# Patient Record
Sex: MALE | Race: WHITE | NOT HISPANIC OR LATINO | URBAN - METROPOLITAN AREA
[De-identification: names, ages, dates, MRNs, and addresses within clinical notes are randomized per-mention and may not be internally consistent; named-entity substitution may affect disease eponyms.]

---

## 2024-05-04 ENCOUNTER — INPATIENT (INPATIENT)
Facility: HOSPITAL | Age: 84
LOS: 1 days | Discharge: HOME | DRG: 390 | End: 2024-05-06
Attending: SURGERY | Admitting: SURGERY
Payer: MEDICARE

## 2024-05-04 VITALS
OXYGEN SATURATION: 96 % | HEART RATE: 66 BPM | WEIGHT: 164.91 LBS | SYSTOLIC BLOOD PRESSURE: 146 MMHG | RESPIRATION RATE: 20 BRPM | DIASTOLIC BLOOD PRESSURE: 67 MMHG | TEMPERATURE: 98 F

## 2024-05-04 LAB
ALBUMIN SERPL ELPH-MCNC: 3.1 G/DL — LOW (ref 3.5–5.2)
ALP SERPL-CCNC: 97 U/L — SIGNIFICANT CHANGE UP (ref 30–115)
ALT FLD-CCNC: 7 U/L — SIGNIFICANT CHANGE UP (ref 0–41)
ANION GAP SERPL CALC-SCNC: 6 MMOL/L — LOW (ref 7–14)
APPEARANCE UR: CLEAR — SIGNIFICANT CHANGE UP
APTT BLD: 32.2 SEC — SIGNIFICANT CHANGE UP (ref 27–39.2)
AST SERPL-CCNC: 8 U/L — SIGNIFICANT CHANGE UP (ref 0–41)
BASOPHILS # BLD AUTO: 0.02 K/UL — SIGNIFICANT CHANGE UP (ref 0–0.2)
BASOPHILS NFR BLD AUTO: 0.3 % — SIGNIFICANT CHANGE UP (ref 0–1)
BILIRUB SERPL-MCNC: 0.3 MG/DL — SIGNIFICANT CHANGE UP (ref 0.2–1.2)
BILIRUB UR-MCNC: NEGATIVE — SIGNIFICANT CHANGE UP
BUN SERPL-MCNC: 11 MG/DL — SIGNIFICANT CHANGE UP (ref 10–20)
CALCIUM SERPL-MCNC: 8.3 MG/DL — LOW (ref 8.4–10.4)
CHLORIDE SERPL-SCNC: 101 MMOL/L — SIGNIFICANT CHANGE UP (ref 98–110)
CO2 SERPL-SCNC: 32 MMOL/L — SIGNIFICANT CHANGE UP (ref 17–32)
COLOR SPEC: YELLOW — SIGNIFICANT CHANGE UP
CREAT SERPL-MCNC: 0.7 MG/DL — SIGNIFICANT CHANGE UP (ref 0.7–1.5)
DIFF PNL FLD: NEGATIVE — SIGNIFICANT CHANGE UP
EGFR: 91 ML/MIN/1.73M2 — SIGNIFICANT CHANGE UP
EOSINOPHIL # BLD AUTO: 0.05 K/UL — SIGNIFICANT CHANGE UP (ref 0–0.7)
EOSINOPHIL NFR BLD AUTO: 0.7 % — SIGNIFICANT CHANGE UP (ref 0–8)
GAS PNL BLDV: SIGNIFICANT CHANGE UP
GLUCOSE SERPL-MCNC: 113 MG/DL — HIGH (ref 70–99)
GLUCOSE UR QL: NEGATIVE MG/DL — SIGNIFICANT CHANGE UP
HCT VFR BLD CALC: 35.7 % — LOW (ref 42–52)
HGB BLD-MCNC: 11 G/DL — LOW (ref 14–18)
IMM GRANULOCYTES NFR BLD AUTO: 0.6 % — HIGH (ref 0.1–0.3)
INR BLD: 0.99 RATIO — SIGNIFICANT CHANGE UP (ref 0.65–1.3)
KETONES UR-MCNC: NEGATIVE MG/DL — SIGNIFICANT CHANGE UP
LEUKOCYTE ESTERASE UR-ACNC: NEGATIVE — SIGNIFICANT CHANGE UP
LIDOCAIN IGE QN: 25 U/L — SIGNIFICANT CHANGE UP (ref 7–60)
LYMPHOCYTES # BLD AUTO: 1.25 K/UL — SIGNIFICANT CHANGE UP (ref 1.2–3.4)
LYMPHOCYTES # BLD AUTO: 17.8 % — LOW (ref 20.5–51.1)
MAGNESIUM SERPL-MCNC: 1.7 MG/DL — LOW (ref 1.8–2.4)
MCHC RBC-ENTMCNC: 25.2 PG — LOW (ref 27–31)
MCHC RBC-ENTMCNC: 30.8 G/DL — LOW (ref 32–37)
MCV RBC AUTO: 81.7 FL — SIGNIFICANT CHANGE UP (ref 80–94)
MONOCYTES # BLD AUTO: 0.34 K/UL — SIGNIFICANT CHANGE UP (ref 0.1–0.6)
MONOCYTES NFR BLD AUTO: 4.8 % — SIGNIFICANT CHANGE UP (ref 1.7–9.3)
NEUTROPHILS # BLD AUTO: 5.32 K/UL — SIGNIFICANT CHANGE UP (ref 1.4–6.5)
NEUTROPHILS NFR BLD AUTO: 75.8 % — HIGH (ref 42.2–75.2)
NITRITE UR-MCNC: NEGATIVE — SIGNIFICANT CHANGE UP
NRBC # BLD: 0 /100 WBCS — SIGNIFICANT CHANGE UP (ref 0–0)
PH UR: 7.5 — SIGNIFICANT CHANGE UP (ref 5–8)
PLATELET # BLD AUTO: 249 K/UL — SIGNIFICANT CHANGE UP (ref 130–400)
PMV BLD: 11.1 FL — HIGH (ref 7.4–10.4)
POTASSIUM SERPL-MCNC: 4.2 MMOL/L — SIGNIFICANT CHANGE UP (ref 3.5–5)
POTASSIUM SERPL-SCNC: 4.2 MMOL/L — SIGNIFICANT CHANGE UP (ref 3.5–5)
PROT SERPL-MCNC: 5.5 G/DL — LOW (ref 6–8)
PROT UR-MCNC: NEGATIVE MG/DL — SIGNIFICANT CHANGE UP
PROTHROM AB SERPL-ACNC: 11.3 SEC — SIGNIFICANT CHANGE UP (ref 9.95–12.87)
RBC # BLD: 4.37 M/UL — LOW (ref 4.7–6.1)
RBC # FLD: 14.9 % — HIGH (ref 11.5–14.5)
SODIUM SERPL-SCNC: 139 MMOL/L — SIGNIFICANT CHANGE UP (ref 135–146)
SP GR SPEC: 1.01 — SIGNIFICANT CHANGE UP (ref 1–1.03)
TROPONIN T, HIGH SENSITIVITY RESULT: 14 NG/L — SIGNIFICANT CHANGE UP (ref 6–21)
UROBILINOGEN FLD QL: 0.2 MG/DL — SIGNIFICANT CHANGE UP (ref 0.2–1)
WBC # BLD: 7.02 K/UL — SIGNIFICANT CHANGE UP (ref 4.8–10.8)
WBC # FLD AUTO: 7.02 K/UL — SIGNIFICANT CHANGE UP (ref 4.8–10.8)

## 2024-05-04 PROCEDURE — 74174 CTA ABD&PLVS W/CONTRAST: CPT | Mod: 26,MC

## 2024-05-04 PROCEDURE — 71275 CT ANGIOGRAPHY CHEST: CPT | Mod: 26,MC

## 2024-05-04 PROCEDURE — 71045 X-RAY EXAM CHEST 1 VIEW: CPT | Mod: 26

## 2024-05-04 PROCEDURE — 99285 EMERGENCY DEPT VISIT HI MDM: CPT

## 2024-05-04 PROCEDURE — 74176 CT ABD & PELVIS W/O CONTRAST: CPT | Mod: 26,59,MC

## 2024-05-04 RX ORDER — ONDANSETRON 8 MG/1
4 TABLET, FILM COATED ORAL ONCE
Refills: 0 | Status: COMPLETED | OUTPATIENT
Start: 2024-05-04 | End: 2024-05-04

## 2024-05-04 RX ORDER — MORPHINE SULFATE 50 MG/1
4 CAPSULE, EXTENDED RELEASE ORAL ONCE
Refills: 0 | Status: DISCONTINUED | OUTPATIENT
Start: 2024-05-04 | End: 2024-05-04

## 2024-05-04 RX ORDER — IOHEXOL 300 MG/ML
30 INJECTION, SOLUTION INTRAVENOUS ONCE
Refills: 0 | Status: COMPLETED | OUTPATIENT
Start: 2024-05-04 | End: 2024-05-04

## 2024-05-04 RX ORDER — MAGNESIUM SULFATE 500 MG/ML
2 VIAL (ML) INJECTION ONCE
Refills: 0 | Status: COMPLETED | OUTPATIENT
Start: 2024-05-04 | End: 2024-05-04

## 2024-05-04 RX ADMIN — ONDANSETRON 4 MILLIGRAM(S): 8 TABLET, FILM COATED ORAL at 17:59

## 2024-05-04 RX ADMIN — Medication 25 GRAM(S): at 19:32

## 2024-05-04 RX ADMIN — MORPHINE SULFATE 4 MILLIGRAM(S): 50 CAPSULE, EXTENDED RELEASE ORAL at 17:59

## 2024-05-04 RX ADMIN — IOHEXOL 30 MILLILITER(S): 300 INJECTION, SOLUTION INTRAVENOUS at 20:50

## 2024-05-04 RX ADMIN — MORPHINE SULFATE 4 MILLIGRAM(S): 50 CAPSULE, EXTENDED RELEASE ORAL at 22:24

## 2024-05-04 NOTE — ED ADULT TRIAGE NOTE - CHIEF COMPLAINT QUOTE
Patient BIBEMS for abdominal pain that started Thursday and constipation, Pt given 100mcg of fentanyl and 8mg of Zofran in the field by seble

## 2024-05-04 NOTE — ED PROVIDER NOTE - CLINICAL SUMMARY MEDICAL DECISION MAKING FREE TEXT BOX
Patient presents with abdominal pain and diarrhea.  Labs and imaging done.  Found to have an SBO.  Surgical team requesting a repeat CT scan after oral contrast which was done.  Repeat scan shows SBO.  Patient admitted to surgical team for further management.

## 2024-05-04 NOTE — ED PROVIDER NOTE - PROGRESS NOTE DETAILS
pt pending surg consult for possible sbo Received sign out from Dr. Perez pending ct and surgery evaluation. Patient presents with abdominal pain. CT shows ilius vs SBO, surgery consulted who is recommending rpt ct scan with oral contrast. Scan pending. Patient reassessed, more comfortable at this time after pain medications.

## 2024-05-04 NOTE — ED PROVIDER NOTE - PHYSICAL EXAMINATION
CONSTITUTIONAL: in no apparent distress.   ENT: Hearing is intact with good acuity to spoken voice.  Patient is speaking clearly, not muffled and airway is intact.   RESPIRATORY: No signs of respiratory distress. Lung sounds are clear in all lobes bilaterally without rales, rhonchi, or wheezes.  CARDIOVASCULAR: Regular rate and rhythm.   GI: tender to palpation in epigastric and periumbilical area. Abdomen is soft, and without distention. Bowel sounds are present and normoactive in all four quadrants. No masses are noted.   BACK: No evidence of trauma or deformity. No CVA tenderness bilaterally. Normal ROM.   NEURO: A & O x 3. Normal speech. No focal deficit.  PSYCHOLOGICAL: Appropriate mood and affect. Good judgement and insight.

## 2024-05-04 NOTE — ED PROVIDER NOTE - OBJECTIVE STATEMENT
83-year-old male with a past medical history of A-fib on Eliquis, BPH, hypertension, and hyperlipidemia who presents to the ED with abdominal pain.  Reports that symptoms started earlier today; pain is in the epigastric area, sharp, constant, and there is no alleviating or worsening factor.  Also endorses diarrhea intermittently for the past 2 days.  Also endorses nausea, but denies vomiting.  Denies fever, shortness of breath, chest pain, vomiting, hematuria, dysuria, and change with bowel movement.

## 2024-05-04 NOTE — ED PROVIDER NOTE - ATTENDING APP SHARED VISIT CONTRIBUTION OF CARE
83-year-old male with past medical history of A-fib on Eliquis, BPH, hypertension, hyperlipidemia, prostate CA, post appendectomy and hernia repair, brought in by ambulance for severe abdominal pain.  As per family patient has been having abdominal cramping and nausea with diarrhea for the last few days.  Patient saw his GI a few days ago and had labs done which were reassuring.  Patient continued to have symptoms, took Pepto this morning and started having more severe pain.  Patient denies any fever or chills.  Patient denies any chest pain or shortness of breath.  Patient does not have any urinary symptoms.  Patient was given fentanyl by [place] which abated the pain, but it has returned while he was in the ED.  Exam: nad, ncat, perrl, eomi, mmm, rrr, ctab, abd soft, tender in the right lower quadrant and epigastrium, mildly distended, aox3, impression: Patient with severe abdominal pain, will check labs, CT abdomen

## 2024-05-04 NOTE — ED PROVIDER NOTE - NS ED MD DISPO DIVISION
Patient is a 89y old  Male who presents with a chief complaint of FTT (10 Aug 2023 19:31)      INTERVAL HPI/OVERNIGHT EVENTS: stable, appetite improved, d/w dtr, agreed to discharge him to home with palliative care. no hospice. repeat ed U/C negative. no need IV abx.     Pain Location & Control: ok    MEDICATIONS  (STANDING):  aspirin enteric coated 81 milliGRAM(s) Oral daily  atorvastatin 80 milliGRAM(s) Oral at bedtime  chlorhexidine 2% Cloths 1 Application(s) Topical <User Schedule>  enoxaparin Injectable 30 milliGRAM(s) SubCutaneous every 24 hours  folic acid 1 milliGRAM(s) Oral daily  levETIRAcetam 500 milliGRAM(s) Oral two times a day  metoprolol tartrate 25 milliGRAM(s) Oral two times a day  mirtazapine 15 milliGRAM(s) Oral daily  pantoprazole    Tablet 40 milliGRAM(s) Oral before breakfast    MEDICATIONS  (PRN):      Allergies    No Known Allergies    Intolerances        REVIEW OF SYSTEMS:  CONSTITUTIONAL: No fever, weight loss, or fatigue  EYES: No eye pain, visual disturbances, or discharge  ENMT:  No difficulty hearing, tinnitus, vertigo; No sinus or throat pain  NECK: No pain or stiffness  BREASTS: No pain, masses, or nipple discharge  RESPIRATORY: No cough, wheezing, chills or hemoptysis; No shortness of breath  CARDIOVASCULAR: No chest pain, palpitations, dizziness, or leg swelling  GASTROINTESTINAL: No abdominal or epigastric pain. No nausea, vomiting, or hematemesis; No diarrhea or constipation. No melena or hematochezia.  GENITOURINARY: No dysuria, frequency, hematuria, or incontinence  NEUROLOGICAL: No headaches, memory loss, loss of strength, numbness, or tremors  SKIN: No itching, burning, rashes, or lesions   LYMPH NODES: No enlarged glands  ENDOCRINE: No heat or cold intolerance; No hair loss; No polydipsia or polyuria  MUSCULOSKELETAL: No back pain  PSYCHIATRIC: No depression, anxiety, mood swings, or difficulty sleeping  HEME/LYMPH: No easy bruising, or bleeding gums  ALLERGY AND IMMUNOLOGIC: No hives or eczema    Vital Signs Last 24 Hrs  T(C): 36.4 (10 Aug 2023 11:10), Max: 36.6 (10 Aug 2023 04:42)  T(F): 97.6 (10 Aug 2023 11:10), Max: 97.9 (10 Aug 2023 04:42)  HR: 63 (10 Aug 2023 17:58) (53 - 74)  BP: 136/62 (10 Aug 2023 17:58) (96/60 - 136/62)  BP(mean): --  RR: 18 (10 Aug 2023 17:58) (16 - 18)  SpO2: 99% (10 Aug 2023 17:58) (94% - 99%)    Parameters below as of 10 Aug 2023 17:58  Patient On (Oxygen Delivery Method): room air        PHYSICAL EXAM:  GENERAL: NAD, well-groomed, well-developed  HEAD:  Atraumatic, Normocephalic  EYES: EOMI, PERRLA, conjunctiva and sclera clear  ENMT: No tonsillar erythema, exudates, or enlargement; Moist mucous membranes, Good dentition, No lesions  NECK: Supple, No JVD, Normal thyroid  NERVOUS SYSTEM:  Alert & Oriented X1, Good concentration; Motor Strength 5/5 B/L upper and lower extremities; DTRs 2+ intact and symmetric  CHEST/LUNG: Clear to auscultation bilaterally; No rales, rhonchi, wheezing, or rubs  HEART: Regular rate and rhythm; No murmurs, rubs, or gallops  ABDOMEN: Soft, Nontender, Nondistended; Bowel sounds present  EXTREMITIES:  2+ Peripheral Pulses, No clubbing or cyanosis  LYMPH: No lymphadenopathy noted  SKIN: No rashes or lesions      LABS:      Ca    9.6        09 Aug 2023 07:17        Urinalysis Basic - ( 09 Aug 2023 07:17 )    Color: x / Appearance: x / SG: x / pH: x  Gluc: 102 mg/dL / Ketone: x  / Bili: x / Urobili: x   Blood: x / Protein: x / Nitrite: x   Leuk Esterase: x / RBC: x / WBC x   Sq Epi: x / Non Sq Epi: x / Bacteria: x      CAPILLARY BLOOD GLUCOSE            Cultures  Culture Results:   10,000 - 49,000 CFU/mL Candida albicans "Susceptibilities not performed"  <10,000 CFU/ml Normal Urogenital edouard present (08-07-23 @ 21:46)      RADIOLOGY & ADDITIONAL TESTS:    Imaging Personally Reviewed:  [x ] YES  [ ] NO    Consultant(s) Notes Reviewed:  [x ] YES  [ ] NO    Care Discussed with Consultants/Other Providers [ x] YES  [ ] NO [facility]

## 2024-05-05 DIAGNOSIS — K56.609 UNSPECIFIED INTESTINAL OBSTRUCTION, UNSPECIFIED AS TO PARTIAL VERSUS COMPLETE OBSTRUCTION: ICD-10-CM

## 2024-05-05 DIAGNOSIS — Z90.49 ACQUIRED ABSENCE OF OTHER SPECIFIED PARTS OF DIGESTIVE TRACT: Chronic | ICD-10-CM

## 2024-05-05 DIAGNOSIS — Z98.890 OTHER SPECIFIED POSTPROCEDURAL STATES: Chronic | ICD-10-CM

## 2024-05-05 LAB
ANION GAP SERPL CALC-SCNC: 5 MMOL/L — LOW (ref 7–14)
ANION GAP SERPL CALC-SCNC: 9 MMOL/L — SIGNIFICANT CHANGE UP (ref 7–14)
BASOPHILS # BLD AUTO: 0.03 K/UL — SIGNIFICANT CHANGE UP (ref 0–0.2)
BASOPHILS NFR BLD AUTO: 0.4 % — SIGNIFICANT CHANGE UP (ref 0–1)
BUN SERPL-MCNC: 11 MG/DL — SIGNIFICANT CHANGE UP (ref 10–20)
BUN SERPL-MCNC: 12 MG/DL — SIGNIFICANT CHANGE UP (ref 10–20)
CALCIUM SERPL-MCNC: 7.8 MG/DL — LOW (ref 8.4–10.5)
CALCIUM SERPL-MCNC: 8.4 MG/DL — SIGNIFICANT CHANGE UP (ref 8.4–10.5)
CHLORIDE SERPL-SCNC: 104 MMOL/L — SIGNIFICANT CHANGE UP (ref 98–110)
CHLORIDE SERPL-SCNC: 105 MMOL/L — SIGNIFICANT CHANGE UP (ref 98–110)
CO2 SERPL-SCNC: 27 MMOL/L — SIGNIFICANT CHANGE UP (ref 17–32)
CO2 SERPL-SCNC: 28 MMOL/L — SIGNIFICANT CHANGE UP (ref 17–32)
CREAT SERPL-MCNC: 0.7 MG/DL — SIGNIFICANT CHANGE UP (ref 0.7–1.5)
CREAT SERPL-MCNC: 0.8 MG/DL — SIGNIFICANT CHANGE UP (ref 0.7–1.5)
EGFR: 87 ML/MIN/1.73M2 — SIGNIFICANT CHANGE UP
EGFR: 91 ML/MIN/1.73M2 — SIGNIFICANT CHANGE UP
EOSINOPHIL # BLD AUTO: 0.11 K/UL — SIGNIFICANT CHANGE UP (ref 0–0.7)
EOSINOPHIL NFR BLD AUTO: 1.5 % — SIGNIFICANT CHANGE UP (ref 0–8)
GLUCOSE SERPL-MCNC: 85 MG/DL — SIGNIFICANT CHANGE UP (ref 70–99)
GLUCOSE SERPL-MCNC: 94 MG/DL — SIGNIFICANT CHANGE UP (ref 70–99)
HCT VFR BLD CALC: 34.4 % — LOW (ref 42–52)
HCT VFR BLD CALC: 34.8 % — LOW (ref 42–52)
HGB BLD-MCNC: 10.3 G/DL — LOW (ref 14–18)
HGB BLD-MCNC: 10.5 G/DL — LOW (ref 14–18)
IMM GRANULOCYTES NFR BLD AUTO: 0.4 % — HIGH (ref 0.1–0.3)
LYMPHOCYTES # BLD AUTO: 1.89 K/UL — SIGNIFICANT CHANGE UP (ref 1.2–3.4)
LYMPHOCYTES # BLD AUTO: 26.1 % — SIGNIFICANT CHANGE UP (ref 20.5–51.1)
MAGNESIUM SERPL-MCNC: 1.9 MG/DL — SIGNIFICANT CHANGE UP (ref 1.8–2.4)
MAGNESIUM SERPL-MCNC: 2.1 MG/DL — SIGNIFICANT CHANGE UP (ref 1.8–2.4)
MCHC RBC-ENTMCNC: 24.8 PG — LOW (ref 27–31)
MCHC RBC-ENTMCNC: 25 PG — LOW (ref 27–31)
MCHC RBC-ENTMCNC: 29.9 G/DL — LOW (ref 32–37)
MCHC RBC-ENTMCNC: 30.2 G/DL — LOW (ref 32–37)
MCV RBC AUTO: 82.9 FL — SIGNIFICANT CHANGE UP (ref 80–94)
MCV RBC AUTO: 82.9 FL — SIGNIFICANT CHANGE UP (ref 80–94)
MONOCYTES # BLD AUTO: 0.53 K/UL — SIGNIFICANT CHANGE UP (ref 0.1–0.6)
MONOCYTES NFR BLD AUTO: 7.3 % — SIGNIFICANT CHANGE UP (ref 1.7–9.3)
NEUTROPHILS # BLD AUTO: 4.64 K/UL — SIGNIFICANT CHANGE UP (ref 1.4–6.5)
NEUTROPHILS NFR BLD AUTO: 64.3 % — SIGNIFICANT CHANGE UP (ref 42.2–75.2)
NRBC # BLD: 0 /100 WBCS — SIGNIFICANT CHANGE UP (ref 0–0)
NRBC # BLD: 0 /100 WBCS — SIGNIFICANT CHANGE UP (ref 0–0)
PHOSPHATE SERPL-MCNC: 3 MG/DL — SIGNIFICANT CHANGE UP (ref 2.1–4.9)
PHOSPHATE SERPL-MCNC: 3.3 MG/DL — SIGNIFICANT CHANGE UP (ref 2.1–4.9)
PLATELET # BLD AUTO: 211 K/UL — SIGNIFICANT CHANGE UP (ref 130–400)
PLATELET # BLD AUTO: 226 K/UL — SIGNIFICANT CHANGE UP (ref 130–400)
PMV BLD: 11 FL — HIGH (ref 7.4–10.4)
PMV BLD: 11.2 FL — HIGH (ref 7.4–10.4)
POTASSIUM SERPL-MCNC: 3.9 MMOL/L — SIGNIFICANT CHANGE UP (ref 3.5–5)
POTASSIUM SERPL-MCNC: 3.9 MMOL/L — SIGNIFICANT CHANGE UP (ref 3.5–5)
POTASSIUM SERPL-SCNC: 3.9 MMOL/L — SIGNIFICANT CHANGE UP (ref 3.5–5)
POTASSIUM SERPL-SCNC: 3.9 MMOL/L — SIGNIFICANT CHANGE UP (ref 3.5–5)
RBC # BLD: 4.15 M/UL — LOW (ref 4.7–6.1)
RBC # BLD: 4.2 M/UL — LOW (ref 4.7–6.1)
RBC # FLD: 14.9 % — HIGH (ref 11.5–14.5)
RBC # FLD: 15.1 % — HIGH (ref 11.5–14.5)
SODIUM SERPL-SCNC: 137 MMOL/L — SIGNIFICANT CHANGE UP (ref 135–146)
SODIUM SERPL-SCNC: 141 MMOL/L — SIGNIFICANT CHANGE UP (ref 135–146)
WBC # BLD: 6.79 K/UL — SIGNIFICANT CHANGE UP (ref 4.8–10.8)
WBC # BLD: 7.23 K/UL — SIGNIFICANT CHANGE UP (ref 4.8–10.8)
WBC # FLD AUTO: 6.79 K/UL — SIGNIFICANT CHANGE UP (ref 4.8–10.8)
WBC # FLD AUTO: 7.23 K/UL — SIGNIFICANT CHANGE UP (ref 4.8–10.8)

## 2024-05-05 PROCEDURE — 99233 SBSQ HOSP IP/OBS HIGH 50: CPT

## 2024-05-05 PROCEDURE — 85025 COMPLETE CBC W/AUTO DIFF WBC: CPT

## 2024-05-05 PROCEDURE — 74018 RADEX ABDOMEN 1 VIEW: CPT | Mod: 26

## 2024-05-05 PROCEDURE — 83735 ASSAY OF MAGNESIUM: CPT

## 2024-05-05 PROCEDURE — 85027 COMPLETE CBC AUTOMATED: CPT

## 2024-05-05 PROCEDURE — 36415 COLL VENOUS BLD VENIPUNCTURE: CPT

## 2024-05-05 PROCEDURE — 80048 BASIC METABOLIC PNL TOTAL CA: CPT

## 2024-05-05 PROCEDURE — 84100 ASSAY OF PHOSPHORUS: CPT

## 2024-05-05 PROCEDURE — 74018 RADEX ABDOMEN 1 VIEW: CPT

## 2024-05-05 RX ORDER — DOXAZOSIN MESYLATE 4 MG
1 TABLET ORAL
Refills: 0 | DISCHARGE

## 2024-05-05 RX ORDER — FLECAINIDE ACETATE 50 MG
50 TABLET ORAL
Refills: 0 | Status: DISCONTINUED | OUTPATIENT
Start: 2024-05-05 | End: 2024-05-06

## 2024-05-05 RX ORDER — BICALUTAMIDE 50 MG/1
50 TABLET, FILM COATED ORAL DAILY
Refills: 0 | Status: DISCONTINUED | OUTPATIENT
Start: 2024-05-05 | End: 2024-05-06

## 2024-05-05 RX ORDER — ENOXAPARIN SODIUM 100 MG/ML
70 INJECTION SUBCUTANEOUS EVERY 12 HOURS
Refills: 0 | Status: DISCONTINUED | OUTPATIENT
Start: 2024-05-05 | End: 2024-05-06

## 2024-05-05 RX ORDER — FLECAINIDE ACETATE 50 MG
1 TABLET ORAL
Refills: 0 | DISCHARGE

## 2024-05-05 RX ORDER — SIMVASTATIN 20 MG/1
1 TABLET, FILM COATED ORAL
Refills: 0 | DISCHARGE

## 2024-05-05 RX ORDER — AMLODIPINE BESYLATE 2.5 MG/1
1 TABLET ORAL
Refills: 0 | DISCHARGE

## 2024-05-05 RX ORDER — BENAZEPRIL HYDROCHLORIDE 40 MG/1
1 TABLET ORAL
Refills: 0 | DISCHARGE

## 2024-05-05 RX ORDER — ACETAMINOPHEN 500 MG
1000 TABLET ORAL ONCE
Refills: 0 | Status: COMPLETED | OUTPATIENT
Start: 2024-05-05 | End: 2024-05-05

## 2024-05-05 RX ORDER — OXYBUTYNIN CHLORIDE 5 MG
1 TABLET ORAL
Refills: 0 | DISCHARGE

## 2024-05-05 RX ORDER — FINASTERIDE 5 MG/1
1 TABLET, FILM COATED ORAL
Refills: 0 | DISCHARGE

## 2024-05-05 RX ORDER — BICALUTAMIDE 50 MG/1
1 TABLET, FILM COATED ORAL
Refills: 0 | DISCHARGE

## 2024-05-05 RX ORDER — KETOROLAC TROMETHAMINE 30 MG/ML
15 SYRINGE (ML) INJECTION EVERY 6 HOURS
Refills: 0 | Status: DISCONTINUED | OUTPATIENT
Start: 2024-05-05 | End: 2024-05-06

## 2024-05-05 RX ORDER — DOXAZOSIN MESYLATE 4 MG
8 TABLET ORAL AT BEDTIME
Refills: 0 | Status: DISCONTINUED | OUTPATIENT
Start: 2024-05-05 | End: 2024-05-06

## 2024-05-05 RX ORDER — FINASTERIDE 5 MG/1
5 TABLET, FILM COATED ORAL DAILY
Refills: 0 | Status: DISCONTINUED | OUTPATIENT
Start: 2024-05-05 | End: 2024-05-06

## 2024-05-05 RX ORDER — TAMSULOSIN HYDROCHLORIDE 0.4 MG/1
1 CAPSULE ORAL
Refills: 0 | DISCHARGE

## 2024-05-05 RX ORDER — TAMSULOSIN HYDROCHLORIDE 0.4 MG/1
0.4 CAPSULE ORAL EVERY 12 HOURS
Refills: 0 | Status: DISCONTINUED | OUTPATIENT
Start: 2024-05-05 | End: 2024-05-06

## 2024-05-05 RX ORDER — CHLORHEXIDINE GLUCONATE 213 G/1000ML
1 SOLUTION TOPICAL
Refills: 0 | Status: DISCONTINUED | OUTPATIENT
Start: 2024-05-05 | End: 2024-05-06

## 2024-05-05 RX ORDER — SODIUM CHLORIDE 9 MG/ML
1000 INJECTION, SOLUTION INTRAVENOUS
Refills: 0 | Status: DISCONTINUED | OUTPATIENT
Start: 2024-05-05 | End: 2024-05-06

## 2024-05-05 RX ORDER — APIXABAN 2.5 MG/1
1 TABLET, FILM COATED ORAL
Refills: 0 | DISCHARGE

## 2024-05-05 RX ORDER — AMLODIPINE BESYLATE 2.5 MG/1
10 TABLET ORAL DAILY
Refills: 0 | Status: DISCONTINUED | OUTPATIENT
Start: 2024-05-05 | End: 2024-05-06

## 2024-05-05 RX ORDER — OXYBUTYNIN CHLORIDE 5 MG
5 TABLET ORAL DAILY
Refills: 0 | Status: DISCONTINUED | OUTPATIENT
Start: 2024-05-05 | End: 2024-05-06

## 2024-05-05 RX ORDER — LISINOPRIL 2.5 MG/1
20 TABLET ORAL DAILY
Refills: 0 | Status: DISCONTINUED | OUTPATIENT
Start: 2024-05-05 | End: 2024-05-06

## 2024-05-05 RX ADMIN — BICALUTAMIDE 50 MILLIGRAM(S): 50 TABLET, FILM COATED ORAL at 16:03

## 2024-05-05 RX ADMIN — Medication 400 MILLIGRAM(S): at 14:50

## 2024-05-05 RX ADMIN — Medication 50 MILLIGRAM(S): at 14:50

## 2024-05-05 RX ADMIN — Medication 5 MILLIGRAM(S): at 12:17

## 2024-05-05 RX ADMIN — Medication 50 MILLIGRAM(S): at 21:45

## 2024-05-05 RX ADMIN — ENOXAPARIN SODIUM 70 MILLIGRAM(S): 100 INJECTION SUBCUTANEOUS at 18:21

## 2024-05-05 RX ADMIN — Medication 400 MILLIGRAM(S): at 21:46

## 2024-05-05 RX ADMIN — ENOXAPARIN SODIUM 70 MILLIGRAM(S): 100 INJECTION SUBCUTANEOUS at 06:22

## 2024-05-05 RX ADMIN — Medication 400 MILLIGRAM(S): at 08:59

## 2024-05-05 RX ADMIN — Medication 8 MILLIGRAM(S): at 21:45

## 2024-05-05 RX ADMIN — FINASTERIDE 5 MILLIGRAM(S): 5 TABLET, FILM COATED ORAL at 12:17

## 2024-05-05 RX ADMIN — Medication 50 MILLIGRAM(S): at 08:59

## 2024-05-05 RX ADMIN — AMLODIPINE BESYLATE 10 MILLIGRAM(S): 2.5 TABLET ORAL at 06:21

## 2024-05-05 RX ADMIN — TAMSULOSIN HYDROCHLORIDE 0.4 MILLIGRAM(S): 0.4 CAPSULE ORAL at 06:21

## 2024-05-05 RX ADMIN — CHLORHEXIDINE GLUCONATE 1 APPLICATION(S): 213 SOLUTION TOPICAL at 06:22

## 2024-05-05 RX ADMIN — Medication 400 MILLIGRAM(S): at 02:08

## 2024-05-05 RX ADMIN — TAMSULOSIN HYDROCHLORIDE 0.4 MILLIGRAM(S): 0.4 CAPSULE ORAL at 18:21

## 2024-05-05 RX ADMIN — LISINOPRIL 20 MILLIGRAM(S): 2.5 TABLET ORAL at 06:21

## 2024-05-05 RX ADMIN — SODIUM CHLORIDE 115 MILLILITER(S): 9 INJECTION, SOLUTION INTRAVENOUS at 02:08

## 2024-05-05 NOTE — CHART NOTE - NSCHARTNOTEFT_GEN_A_CORE
Feeling better.   Had flatus and diarrhea overnight,  Abdomen is soft, NT, ND.  Start clears.  Increase ambulation

## 2024-05-05 NOTE — H&P ADULT - NSHPLABSRESULTS_GEN_ALL_CORE
LAB/STUDIES:                        11.0   7.02  )-----------( 249      ( 04 May 2024 18:08 )             35.7     05-04    139  |  101  |  11  ----------------------------<  113<H>  4.2   |  32  |  0.7    Ca    8.3<L>      04 May 2024 18:08  Mg     1.7     -    TPro  5.5<L>  /  Alb  3.1<L>  /  TBili  0.3  /  DBili  x   /  AST  8   /  ALT  7   /  AlkPhos  97  05-04    PT/INR - ( 04 May 2024 18:08 )   PT: 11.30 sec;   INR: 0.99 ratio         PTT - ( 04 May 2024 18:08 )  PTT:32.2 sec  LIVER FUNCTIONS - ( 04 May 2024 18:08 )  Alb: 3.1 g/dL / Pro: 5.5 g/dL / ALK PHOS: 97 U/L / ALT: 7 U/L / AST: 8 U/L / GGT: x           Urinalysis Basic - ( 04 May 2024 21:09 )    Color: Yellow / Appearance: Clear / S.013 / pH: x  Gluc: x / Ketone: Negative mg/dL  / Bili: Negative / Urobili: 0.2 mg/dL   Blood: x / Protein: Negative mg/dL / Nitrite: Negative   Leuk Esterase: Negative / RBC: x / WBC x   Sq Epi: x / Non Sq Epi: x / Bacteria: x            Urinalysis with Rflx Culture (collected 04 May 2024 21:09)      IMAGING:  < from: CT Abdomen and Pelvis w/ Oral Cont (24 @ 23:35) >  Concern for small bowel obstruction with loops measuring up to 4.2 cm   with likely transition in the midline of the lower abdomen/pelvis.

## 2024-05-05 NOTE — H&P ADULT - NSHPPHYSICALEXAM_GEN_ALL_CORE
VITALS:  T(F): 98 (05-04-24 @ 17:05), Max: 98 (05-04-24 @ 17:05)  HR: 66 (05-04-24 @ 17:05) (66 - 66)  BP: 146/67 (05-04-24 @ 17:05) (146/67 - 146/67)  RR: 20 (05-04-24 @ 17:05) (20 - 20)  SpO2: 96% (05-04-24 @ 17:05) (96% - 96%)    PHYSICAL EXAM:  General: NAD, AAOx3, calm and cooperative  HEENT: NCAT, CHANELL, EOMI,   Cardiac: RRR  Respiratory: Normal respiratory effort  Abdomen: Soft, non-distended, mildly tender B/L LQ, no rebound, no guarding.   Skin: Warm/dry, normal color,

## 2024-05-05 NOTE — H&P ADULT - NSICDXPASTMEDICALHX_GEN_ALL_CORE_FT
PAST MEDICAL HISTORY:  Afib     BPH (benign prostatic hyperplasia)     HLD (hyperlipidemia)     HTN (hypertension)     Prostate CA

## 2024-05-05 NOTE — H&P ADULT - HISTORY OF PRESENT ILLNESS
Patient: ESTEFANI NEELY , 83y ([birth year])Male   MRN: [id number]  Location: [facility] ED  Visit: 05-04-24 Emergency  Date: 05-04-24 @ 23:31    HPI: 83 year old male with PMH of afib on eliquis, BPH, HTN, HLD, prostate CA, appendectomy, hernia repair, presents with abdominal pain. Patient reports generalized abdominal pain started on Thursday, with associated nausea and diarrhea. Patient had subjective fevers, but denies chills, HA, SOB, vomiting, or urinary changes. Family at bedside reports patient has chronic abdominal pain that typically self resolves, had multiple unremarkable GI workup. Today, abdominal pain worsened, unable to self resolve. CTAP concern for SBO. At bedside examination, abdomen is soft, ND, mildly tender to bilateral lower quadrants.

## 2024-05-05 NOTE — H&P ADULT - ASSESSMENT
ASSESSMENT:  83yM w/ PMHx of afib on eliquis, BPH, HTN, HLD, prostate CA, appendectomy, hernia repair, presents with abdominal pain. Physical exam findings, imaging, and labs as documented above.     PLAN:  - Admit to Surgery 4C   - NPO, IVF  - NGT if vomits  - Pain control   - Monitor for gas and bowel movements   - Hemodynamic monitoring   - DVT ppx  - Encourage ambulation     Above plan discussed with Attending Surgeon Dr. Hameed, patient, patient family, and Primary team

## 2024-05-05 NOTE — H&P ADULT - ATTENDING COMMENTS
83yM w/ PMHx of afib on eliquis, BPH, HTN, HLD, prostate CA, appendectomy, hernia repair, presents with abdominal pain. Physical exam findings, imaging, and labs as documented above.     PHYSICAL EXAM:  General: NAD, AAOx3, calm and cooperative  HEENT: NCAT, CHANELL, EOMI,   Cardiac: RRR  Respiratory: Normal respiratory effort  Abdomen: Soft, non-distended, mildly tender B/L LQ, no rebound, no guarding.   Skin: Warm/dry, normal color,    PLAN:  - Admit to Surgery 4C   - NPO, IVF  - NGT if vomits  - Pain control   - Monitor for gas and bowel movements   - Hemodynamic monitoring   - DVT ppx  - Encourage ambulation

## 2024-05-06 VITALS
OXYGEN SATURATION: 95 % | HEART RATE: 61 BPM | SYSTOLIC BLOOD PRESSURE: 143 MMHG | RESPIRATION RATE: 18 BRPM | TEMPERATURE: 98 F | DIASTOLIC BLOOD PRESSURE: 67 MMHG

## 2024-05-06 PROCEDURE — 99233 SBSQ HOSP IP/OBS HIGH 50: CPT

## 2024-05-06 RX ORDER — OXYCODONE HYDROCHLORIDE 5 MG/1
1 TABLET ORAL
Qty: 10 | Refills: 0
Start: 2024-05-06

## 2024-05-06 RX ORDER — SODIUM CHLORIDE 9 MG/ML
500 INJECTION, SOLUTION INTRAVENOUS ONCE
Refills: 0 | Status: COMPLETED | OUTPATIENT
Start: 2024-05-06 | End: 2024-05-06

## 2024-05-06 RX ORDER — KETOROLAC TROMETHAMINE 30 MG/ML
1 SYRINGE (ML) INJECTION
Qty: 10 | Refills: 0
Start: 2024-05-06

## 2024-05-06 RX ADMIN — TAMSULOSIN HYDROCHLORIDE 0.4 MILLIGRAM(S): 0.4 CAPSULE ORAL at 05:27

## 2024-05-06 RX ADMIN — BICALUTAMIDE 50 MILLIGRAM(S): 50 TABLET, FILM COATED ORAL at 11:29

## 2024-05-06 RX ADMIN — Medication 15 MILLIGRAM(S): at 12:10

## 2024-05-06 RX ADMIN — CHLORHEXIDINE GLUCONATE 1 APPLICATION(S): 213 SOLUTION TOPICAL at 05:28

## 2024-05-06 RX ADMIN — Medication 5 MILLIGRAM(S): at 11:29

## 2024-05-06 RX ADMIN — FINASTERIDE 5 MILLIGRAM(S): 5 TABLET, FILM COATED ORAL at 11:29

## 2024-05-06 RX ADMIN — Medication 15 MILLIGRAM(S): at 12:40

## 2024-05-06 RX ADMIN — TAMSULOSIN HYDROCHLORIDE 0.4 MILLIGRAM(S): 0.4 CAPSULE ORAL at 17:20

## 2024-05-06 RX ADMIN — LISINOPRIL 20 MILLIGRAM(S): 2.5 TABLET ORAL at 05:28

## 2024-05-06 RX ADMIN — Medication 50 MILLIGRAM(S): at 08:11

## 2024-05-06 RX ADMIN — ENOXAPARIN SODIUM 70 MILLIGRAM(S): 100 INJECTION SUBCUTANEOUS at 05:28

## 2024-05-06 RX ADMIN — Medication 50 MILLIGRAM(S): at 14:00

## 2024-05-06 RX ADMIN — SODIUM CHLORIDE 1000 MILLILITER(S): 9 INJECTION, SOLUTION INTRAVENOUS at 13:22

## 2024-05-06 RX ADMIN — AMLODIPINE BESYLATE 10 MILLIGRAM(S): 2.5 TABLET ORAL at 05:27

## 2024-05-06 RX ADMIN — ENOXAPARIN SODIUM 70 MILLIGRAM(S): 100 INJECTION SUBCUTANEOUS at 17:20

## 2024-05-06 NOTE — PROVIDER CONTACT NOTE (OTHER) - RECOMMENDATIONS
RN encouraging PO fluids
RN is encouraging PO fluid intake
Rn encouraging PO fluids
pt offered pain meds and declined

## 2024-05-06 NOTE — DISCHARGE NOTE PROVIDER - NSDCMRMEDTOKEN_GEN_ALL_CORE_FT
amLODIPine 10 mg oral tablet: 1 tab(s) orally once a day (in the morning)  benazepril 20 mg oral tablet: 1 tab(s) orally once a day (in the morning)  bicalutamide 50 mg oral tablet: 1 tab(s) orally once a day (at bedtime)  doxazosin 8 mg oral tablet: 1 tab(s) orally once a day (at bedtime)  Eliquis 5 mg oral tablet: 1 tab(s) orally every 12 hours  finasteride 5 mg oral tablet: 1 tab(s) orally once a day  flecainide 50 mg oral tablet: 1 tab(s) orally every 8 hours  oxyBUTYnin 5 mg oral tablet: 1 tab(s) orally once a day (in the morning)  simvastatin 20 mg oral tablet: 1 tab(s) orally once a day (at bedtime)  tamsulosin 0.4 mg oral capsule: 1 cap(s) orally every 12 hours  
03-Mar-2023 17:38

## 2024-05-06 NOTE — DISCHARGE NOTE PROVIDER - HOSPITAL COURSE
ESTEFANI NEELY is an 84 y/o male w/ PMHx of Afib (on Eliquis), HTN, HLD, prostate cancer, surgical history of appendectomy and hernia repair, who presented to [facility] on 5/5/24 complaining of generalized abdominal pain associated w/ nausea, diarrhea, and subjective fevers. CT abdomen/pelvis was obtained which showed SBO measuring 4.2 cm w/ likely transition in the midline of the lower abdomen/pelvis. Admitted to surgical service for non-operative management.     On HD#2 (5/6), patient is passing flatus and having bowel movements, tolerating CLD, and his pain is well controlled. He is surgically cleared for discharge today. He will be going home with no needs. ESTEFANI NEELY is an 84 y/o male w/ PMHx of Afib (on Eliquis), HTN, HLD, prostate cancer, surgical history of appendectomy and hernia repair, who presented to [facility] on 5/5/24 complaining of generalized abdominal pain associated w/ nausea, diarrhea, and subjective fevers. CT abdomen/pelvis was obtained which showed SBO measuring 4.2 cm w/ likely transition in the midline of the lower abdomen/pelvis. Admitted to surgical service for non-operative management. He did develop lightheadedness when ambulating and orthostatics were taken which were positive. He was given 500cc bolus of fluids and instructed to increase his salt and water intake and follow up with his PCP within 1 week.     On HD#2 (5/6), patient is passing flatus and having bowel movements, tolerating CLD, and his pain is well controlled. He is surgically cleared for discharge today. He will be going home with no needs.

## 2024-05-06 NOTE — PROGRESS NOTE ADULT - ATTENDING COMMENTS
Trauma/Acute Care Surgery Attending Note Attestation    Patient was seen and examined bedside.  I reviewed the resident/PA note and agreed with above assessment and plan with following additions and corrections.    Patient reports passing gas and diarrhea. Also reports having dizziness. No nausea, vomiting, chest pain, SOB, abdominal pain.    T(C): 36.7 (05-06-24 @ 09:32), Max: 36.8 (05-06-24 @ 08:48)  HR: 64 (05-06-24 @ 09:32) (57 - 89)  BP: 123/54 (05-06-24 @ 09:32) (118/61 - 126/62)  RR: 18 (05-06-24 @ 08:48) (18 - 18)  SpO2: 95% (05-06-24 @ 14:37) (94% - 97%)      05-05-24 @ 07:01  -  05-06-24 @ 07:00  --------------------------------------------------------  IN:    Lactated Ringers: 115 mL  Total IN: 115 mL    OUT:    Voided (mL): 400 mL  Total OUT: 400 mL    Total NET: -285 mL      05-06-24 @ 07:01  -  05-06-24 @ 16:26  --------------------------------------------------------  IN:    Oral Fluid: 650 mL  Total IN: 650 mL    OUT:    Voided (mL): 650 mL  Total OUT: 650 mL    Total NET: 0 mL    I independently performed a medically appropriate exam. The exam was notable for soft, not distended, not tender abdomen.                          10.3   6.79  )-----------( 211      ( 05 May 2024 21:02 )             34.4     05-05    137  |  104  |  11  ----------------------------<  94  3.9   |  28  |  0.7    Ca 7.8<L>; Mg 1.9; Phos 3.0      ( 04 May 2024 18:08 )  Alb: 3.1 g/dL / Pro: 5.5 g/dL / AlkPhos: 97 U/L / ALT: 7 U/L / AST: 8 U/L / GGT:x     / Tbili 0.3 mg/dL/ Dbili x     / Indbili x        PT/INR/PTT - ( 04 May 2024 18:08 )   PT: 11.30 sec; INR: 0.99 ratio; PTT 32.2 sec           Assessment/Plan:  83y Male PMH afib on eliquis, BPH, HTN, HLD admitted with SBO  - SBO - resolving, advance to low fiber diet  - Orthostatic hypotension - patient was dizzy this morning, orthostatics were checked and found to be positive, given 500 cc IVF and encouraged oral intake, follow up this afternoon  - Afib - flecainade 50mg TID, lovenox 70mg BID --> restart eliquis when able  - HTN - amlodipine 10mg daily, lisinopril 20mg daily  - BPH/Hx of prostate cancer - doxazosin 8mg daily, finasteride 5mg daily, tamsulosin 0.4mg daily, casodex 50mg daily    Gibran Russo MD  Trauma/Acute Care Surgery/Surgical Critical Care Attending Trauma/Acute Care Surgery Attending Note Attestation    Patient was seen and examined bedside.  I reviewed the resident/PA note and agreed with above assessment and plan with following additions and corrections.    Patient reports passing gas and diarrhea. Also reports having dizziness. No nausea, vomiting, chest pain, SOB, abdominal pain.    T(C): 36.7 (05-06-24 @ 09:32), Max: 36.8 (05-06-24 @ 08:48)  HR: 64 (05-06-24 @ 09:32) (57 - 89)  BP: 123/54 (05-06-24 @ 09:32) (118/61 - 126/62)  RR: 18 (05-06-24 @ 08:48) (18 - 18)  SpO2: 95% (05-06-24 @ 14:37) (94% - 97%)      05-05-24 @ 07:01  -  05-06-24 @ 07:00  --------------------------------------------------------  IN:    Lactated Ringers: 115 mL  Total IN: 115 mL    OUT:    Voided (mL): 400 mL  Total OUT: 400 mL    Total NET: -285 mL      05-06-24 @ 07:01  -  05-06-24 @ 16:26  --------------------------------------------------------  IN:    Oral Fluid: 650 mL  Total IN: 650 mL    OUT:    Voided (mL): 650 mL  Total OUT: 650 mL    Total NET: 0 mL    I independently performed a medically appropriate exam. The exam was notable for soft, not distended, not tender abdomen.                          10.3   6.79  )-----------( 211      ( 05 May 2024 21:02 )             34.4     05-05    137  |  104  |  11  ----------------------------<  94  3.9   |  28  |  0.7    Ca 7.8<L>; Mg 1.9; Phos 3.0      ( 04 May 2024 18:08 )  Alb: 3.1 g/dL / Pro: 5.5 g/dL / AlkPhos: 97 U/L / ALT: 7 U/L / AST: 8 U/L / GGT:x     / Tbili 0.3 mg/dL/ Dbili x     / Indbili x        PT/INR/PTT - ( 04 May 2024 18:08 )   PT: 11.30 sec; INR: 0.99 ratio; PTT 32.2 sec           Assessment/Plan:  83y Male PMH afib on eliquis, BPH, HTN, HLD admitted with SBO  - SBO - resolving, advance to low fiber diet  - Orthostatic hypotension - patient was dizzy this morning, orthostatics were checked and found to be positive with greater than 20mmHg drop in SBP from laying down to standing, given 500 cc IVF and encouraged oral intake, follow up this afternoon  - Afib - flecainade 50mg TID, lovenox 70mg BID --> restart eliquis when able  - HTN - amlodipine 10mg daily, lisinopril 20mg daily  - BPH/Hx of prostate cancer - doxazosin 8mg daily, finasteride 5mg daily, tamsulosin 0.4mg daily, casodex 50mg daily    Gibran Russo MD  Trauma/Acute Care Surgery/Surgical Critical Care Attending

## 2024-05-06 NOTE — PROGRESS NOTE ADULT - SUBJECTIVE AND OBJECTIVE BOX
GENERAL SURGERY PROGRESS NOTE     NEELY, ESTEFANI  83y  Male  Hospital day :1d     OVERNIGHT EVENTS:    - LATESHA  - HDS and afebrile  - Having bowel movements and passing gas  - Tolerating diet  - Abdomen NTND    T(F): 97.1 (05-06-24 @ 00:24), Max: 98.3 (05-05-24 @ 12:45)  HR: 57 (05-06-24 @ 00:24) (57 - 67)  BP: 118/61 (05-06-24 @ 00:24) (118/61 - 146/67)  ABP: --  ABP(mean): --  RR: 18 (05-06-24 @ 00:24) (18 - 18)  SpO2: 96% (05-06-24 @ 00:24) (94% - 96%)    DIET/FLUIDS: lactated ringers. 1000 milliLiter(s) IV Continuous <Continuous>     GI proph:    AC/ proph: enoxaparin Injectable 70 milliGRAM(s) SubCutaneous every 12 hours    ABx:     PHYSICAL EXAM:  GENERAL: NAD   HEART: Regular rate and rhythm  ABDOMEN: Soft, Nontender, Nondistended;   EXTREMITIES:  No clubbing, cyanosis, or edema      LABS  Labs:  CAPILLARY BLOOD GLUCOSE                              10.3   6.79  )-----------( 211      ( 05 May 2024 21:02 )             34.4       Auto Neutrophil %: 64.3 % (05-05-24 @ 06:30)  Auto Immature Granulocyte %: 0.4 % (05-05-24 @ 06:30)    05-05    137  |  104  |  11  ----------------------------<  94  3.9   |  28  |  0.7      Calcium: 7.8 mg/dL (05-05-24 @ 21:02)      LFTs:             5.5  | 0.3  | 8        ------------------[97      ( 04 May 2024 18:08 )  3.1  | x    | 7           Lipase:25     Amylase:x         Blood Gas Venous - Lactate: 0.6 mmol/L (05-04-24 @ 18:02)      Coags:     11.30  ----< 0.99    ( 04 May 2024 18:08 )     32.2                Urinalysis Basic - ( 05 May 2024 21:02 )    Color: x / Appearance: x / SG: x / pH: x  Gluc: 94 mg/dL / Ketone: x  / Bili: x / Urobili: x   Blood: x / Protein: x / Nitrite: x   Leuk Esterase: x / RBC: x / WBC x   Sq Epi: x / Non Sq Epi: x / Bacteria: x        Urinalysis with Rflx Culture (collected 04 May 2024 21:09)          RADIOLOGY & ADDITIONAL TESTS:      A/P    83yM w/ PMHx of afib on eliquis, BPH, HTN, HLD, prostate CA, appendectomy, hernia repair, presents with abdominal pain. Physical exam findings, imaging, and labs as documented above.     PLAN:  - CLD w/ ensures  - NGT if vomits  - Pain control   - Monitor for gas and bowel movements   - Hemodynamic monitoring   - DVT ppx  - Encourage ambulation

## 2024-05-06 NOTE — PROVIDER CONTACT NOTE (OTHER) - ACTION/TREATMENT ORDERED:
Provider stated to keep pt on 2L for now, removed when pt is comfortable and reassess pulse ox on rm, do not d/c pt until after 12pm

## 2024-05-06 NOTE — DISCHARGE NOTE NURSING/CASE MANAGEMENT/SOCIAL WORK - NSDCPEFALRISK_GEN_ALL_CORE
For information on Fall & Injury Prevention, visit: [email or web address] OR  [email or web address] OR  https://www.cdc.gov/steadi/patient.html

## 2024-05-06 NOTE — DISCHARGE NOTE NURSING/CASE MANAGEMENT/SOCIAL WORK - PATIENT PORTAL LINK FT
You can access the FollowMyHealth Patient Portal offered by [facility] by registering at the following website: [email or web address]. By joining [employer]’s FollowMyHealth portal, you will also be able to view your health information using other applications (apps) compatible with our system.

## 2024-05-06 NOTE — PROVIDER CONTACT NOTE (OTHER) - SITUATION
pt ate low fiber diet and tolerated it, pt ambulated to bathroom and c/o dizziness, pt now back in bed and stated he still feels light headed.
pt received IVF Bolus as ordered. RN rechecked Orthostatic BP: Lying 126/61 HR 64, sitting 112/53 HR 64, and sitting 99/48 HR 64. pt c/o dizziness when standing. pt also stated he did not eat lunch
pt stated he feels weak and does not feel comfortable going home. pt stated he feels dizzy when ambulating and is afraid to eat food. pt's d/c is still active
pt ambulated to bathroom and c/o feeling dizzy and generally weak. RN took orthostatics, BP Lying 131/53 HR 64, sitting 111/49 HR 64, and standing 91/49 HR 64. pt also had another episode
pt has discharge order in for today, as per pt he had 3 episodes of diarrhea, pt stated he feels weak, continues to c/o dizziness when ambulating, RN made pt aware of discharge and pt stated he does

## 2024-05-06 NOTE — DISCHARGE NOTE PROVIDER - NSDCCPCAREPLAN_GEN_ALL_CORE_FT
PRINCIPAL DISCHARGE DIAGNOSIS  Diagnosis: SBO (small bowel obstruction)  Assessment and Plan of Treatment:   DIET: No restrictions     PAIN: You can take over the counter medications such as Tylenol, and Ibuprofen for pain control. Please adhere to the instructions on the back of the bottle. Pain medications were sent to your pharmacy. Please only take in the event of severe pain. Please take as directed, as needed. Dispose of any remaining tablets.      ANTIBIOTICS: None     If you develop fevers, chills, worsening pain, increased drainage from the wound, foul smelling drainage from the wound, nausea that won't subside, vomiting, or any other symptoms of concern, please call MD for further advice, evaluation, and/or treatment.     ACTIVITY: Ambulate and get out of bed as tolerated, and with assistance if feeling weak.     FOLLOW-UP: No surgery follow-up is needed. Please follow-up with your PCP outpatient.        PRINCIPAL DISCHARGE DIAGNOSIS  Diagnosis: SBO (small bowel obstruction)  Assessment and Plan of Treatment: DIET: No restrictions. You should increase your salt and water intake as your blood pressure was mildly low during your hospitalization. Please follow-up with your PCP as soon as possible.       PAIN: You can take over the counter medications such as Tylenol, and Ibuprofen for pain control. Please adhere to the instructions on the back of the bottle. Pain medications were sent to your pharmacy. Please only take in the event of severe pain. Please take as directed, as needed. Dispose of any remaining tablets.      ANTIBIOTICS: None     If you develop fevers, chills, worsening pain, increased drainage from the wound, foul smelling drainage from the wound, nausea that won't subside, vomiting, or any other symptoms of concern, please call MD for further advice, evaluation, and/or treatment.     ACTIVITY: Ambulate and get out of bed as tolerated, and with assistance if feeling weak.     FOLLOW-UP: No surgery follow-up is needed. Please follow-up with your PCP outpatient.

## 2024-05-06 NOTE — PROVIDER CONTACT NOTE (OTHER) - ACTION/TREATMENT ORDERED:
Provider stated that team is rounding now. aware of Orthostatic BP results and c/o dizziness when getting up. provider stated to wait on discharge and see if pt improves.

## 2024-05-06 NOTE — PROVIDER CONTACT NOTE (OTHER) - ACTION/TREATMENT ORDERED:
Provider aware of the above information, will notify team. Provider aware of the above information, will notify team. keep pt on rm air

## 2024-05-13 DIAGNOSIS — K56.609 UNSPECIFIED INTESTINAL OBSTRUCTION, UNSPECIFIED AS TO PARTIAL VERSUS COMPLETE OBSTRUCTION: ICD-10-CM

## 2024-05-13 DIAGNOSIS — Z90.49 ACQUIRED ABSENCE OF OTHER SPECIFIED PARTS OF DIGESTIVE TRACT: ICD-10-CM

## 2024-05-13 DIAGNOSIS — Z79.01 LONG TERM (CURRENT) USE OF ANTICOAGULANTS: ICD-10-CM

## 2024-05-13 DIAGNOSIS — I95.1 ORTHOSTATIC HYPOTENSION: ICD-10-CM

## 2024-05-13 DIAGNOSIS — Z85.46 PERSONAL HISTORY OF MALIGNANT NEOPLASM OF PROSTATE: ICD-10-CM

## 2024-05-13 DIAGNOSIS — Z88.0 ALLERGY STATUS TO PENICILLIN: ICD-10-CM

## 2024-05-13 DIAGNOSIS — I10 ESSENTIAL (PRIMARY) HYPERTENSION: ICD-10-CM

## 2024-05-13 DIAGNOSIS — E78.5 HYPERLIPIDEMIA, UNSPECIFIED: ICD-10-CM

## 2024-05-13 DIAGNOSIS — I48.91 UNSPECIFIED ATRIAL FIBRILLATION: ICD-10-CM

## 2024-05-13 DIAGNOSIS — N40.0 BENIGN PROSTATIC HYPERPLASIA WITHOUT LOWER URINARY TRACT SYMPTOMS: ICD-10-CM

## 2024-11-26 NOTE — ED ADULT TRIAGE NOTE - PRO INTERPRETER NEED 2
"Daily Note     Today's date: 2024  Patient name: Raghav Blair  : 1990  MRN: [id number]  Referring provider: Lachman, James R, MD  Dx:   Encounter Diagnosis     ICD-10-CM    1. Rupture of left Achilles tendon, subsequent encounter  S86.012D                      Subjective: Pt has removed a second wedge in his CAM boot. He has one wedge left. He had minimal discomfort of the L heel prior to PT.       Objective: See treatment diary below      Assessment: Tolerated treatment well. Patient demonstrated fatigue post treatment, exhibited good technique with therapeutic exercises, and would benefit from continued PT      Plan: Continue per plan of care.  Progress treament per protocol.      Precautions: S/P Left achilles repair DOS 10/24 Dr. Lachman      Manuals          PROM Left ankle Avoid passive DF  PF INV EV  PF INV EV     TM PF INV EV     RR PF INV EV     TM                                                Neuro Re-Ed             Ankle isometrics HEP 5\" x10 ea 5''20x  5\" x20         Ankle ABC HEP  x1 x1 x1         Ankle AROM all planes  HEP  X20 ea x20 x20         Baps Board   X20 ea dir X20 each  X20 ea dir         NU step in CAM boot              Upright bike for endurance (in CAM boot)   L4 x10 min L4 x10 min L4 x10 min         Toe curls   X2 min X2 min X2 min         Towel IV/EV  X2 min ea X2 min  X2 min          Seated HR  x20 X20  x20         Ther Ex             SLR x4 dir  X20 ea dir X20 each dir  X20 each dir                                                                                                     Ther Activity                                       Gait Training                                       Modalities                                                " Prescription from patient advice    English